# Patient Record
Sex: MALE | Race: WHITE
[De-identification: names, ages, dates, MRNs, and addresses within clinical notes are randomized per-mention and may not be internally consistent; named-entity substitution may affect disease eponyms.]

---

## 2018-09-20 ENCOUNTER — HOSPITAL ENCOUNTER (EMERGENCY)
Dept: HOSPITAL 97 - ER | Age: 53
Discharge: HOME | End: 2018-09-20
Payer: COMMERCIAL

## 2018-09-20 DIAGNOSIS — R06.00: Primary | ICD-10-CM

## 2018-09-20 DIAGNOSIS — K21.9: ICD-10-CM

## 2018-09-20 DIAGNOSIS — E78.00: ICD-10-CM

## 2018-09-20 LAB
ALBUMIN SERPL BCP-MCNC: 3.7 G/DL (ref 3.4–5)
ALP SERPL-CCNC: 105 U/L (ref 45–117)
ALT SERPL W P-5'-P-CCNC: 41 U/L (ref 12–78)
AST SERPL W P-5'-P-CCNC: 25 U/L (ref 15–37)
BUN BLD-MCNC: 21 MG/DL (ref 7–18)
GLUCOSE SERPLBLD-MCNC: 106 MG/DL (ref 74–106)
HCT VFR BLD CALC: 45.4 % (ref 39.6–49)
INR BLD: 0.98
LYMPHOCYTES # SPEC AUTO: 2.1 K/UL (ref 0.7–4.9)
MAGNESIUM SERPL-MCNC: 2.3 MG/DL (ref 1.8–2.4)
MCH RBC QN AUTO: 32.5 PG (ref 27–35)
MCV RBC: 93.3 FL (ref 80–100)
NT-PROBNP SERPL-MCNC: 11 PG/ML (ref ?–125)
PMV BLD: 8 FL (ref 7.6–11.3)
POTASSIUM SERPL-SCNC: 4.3 MMOL/L (ref 3.5–5.1)
RBC # BLD: 4.87 M/UL (ref 4.33–5.43)
TROPONIN (EMERG DEPT USE ONLY): < 0.02 NG/ML (ref 0–0.04)

## 2018-09-20 PROCEDURE — 93005 ELECTROCARDIOGRAM TRACING: CPT

## 2018-09-20 PROCEDURE — 96374 THER/PROPH/DIAG INJ IV PUSH: CPT

## 2018-09-20 PROCEDURE — 83735 ASSAY OF MAGNESIUM: CPT

## 2018-09-20 PROCEDURE — 83880 ASSAY OF NATRIURETIC PEPTIDE: CPT

## 2018-09-20 PROCEDURE — 36415 COLL VENOUS BLD VENIPUNCTURE: CPT

## 2018-09-20 PROCEDURE — 80076 HEPATIC FUNCTION PANEL: CPT

## 2018-09-20 PROCEDURE — 71275 CT ANGIOGRAPHY CHEST: CPT

## 2018-09-20 PROCEDURE — 85610 PROTHROMBIN TIME: CPT

## 2018-09-20 PROCEDURE — 85025 COMPLETE CBC W/AUTO DIFF WBC: CPT

## 2018-09-20 PROCEDURE — 99285 EMERGENCY DEPT VISIT HI MDM: CPT

## 2018-09-20 PROCEDURE — 80048 BASIC METABOLIC PNL TOTAL CA: CPT

## 2018-09-20 PROCEDURE — 96375 TX/PRO/DX INJ NEW DRUG ADDON: CPT

## 2018-09-20 PROCEDURE — 71045 X-RAY EXAM CHEST 1 VIEW: CPT

## 2018-09-20 PROCEDURE — 84484 ASSAY OF TROPONIN QUANT: CPT

## 2018-09-20 NOTE — RAD REPORT
EXAM DESCRIPTION:  RAD - Chest Single View - 9/20/2018 7:40 am

 

CLINICAL HISTORY:  CHEST PAIN

Chest pain.

 

COMPARISON:  No comparisons

 

FINDINGS:  Portable technique limits examination quality.

 

The lungs are grossly clear. The heart is normal in size. No displaced fractures.Hardware is present 
in the cervical spine.

 

IMPRESSION:  No acute intrathoracic process suspected.

## 2018-09-20 NOTE — RAD REPORT
EXAM DESCRIPTION:  CT - Chest For Pe Angio - 9/20/2018 8:25 am

 

CLINICAL HISTORY:  Chest pain.

Chest pain;Dyspnea

 

COMPARISON:  No comparisons

 

TECHNIQUE:  CT angiogram of the pulmonary arteries was performed with MIP.

 

All CT scans are performed using dose optimization technique as appropriate and may include automated
 exposure control or mA/KV adjustment according to patient size.

 

FINDINGS:  No evidence of pulmonary thromboembolism.

 

No acute aortic finding demonstrated.

 

Linear subsegmental atelectasis is present in the right lung base. Mild interstitial pulmonary edema 
versus interstitial pneumonia suspected.

 

No significant pericardial or pleural fluid.

 

Postsurgical hardware plate noted in the lower cervical spine. The paraspinal soft tissues in the reg
ion are mildly thickened without a significant fluid collection seen.

 

IMPRESSION:  No evidence of pulmonary thromboembolism.

 

Prominent interstitial markings bilaterally could indicate mild interstitial pulmonary edema versus i
nterstitial pneumonitis.

## 2018-09-20 NOTE — EDPHYS
Physician Documentation                                                                           

 [facility]                                                                

Name: Moises Wright                                                                                 

Age: 53 yrs                                                                                       

Sex: Male                                                                                         

: 1965                                                                                   

MRN: [id number]                                                                                   

Arrival Date: 2018                                                                          

Time: 06:56                                                                                       

Account#: [id number]                                                                            

Bed 6                                                                                             

Private MD: Ja Bueno                                                                         

ED Physician Jean Woodruff                                                                       

HPI:                                                                                              

                                                                                             

07:07 This 53 yrs old  Male presents to ER via Wheelchair with complaints of Chest   kdr 

      Pain > 31 y/o.                                                                              

07:07 The patient or guardian reports chest pain that is located primarily in the substernal  kdr 

      area, anterior chest wall, bilaterally. Onset: suddenly, this morning. The pain             

      radiates to neck, back. Associated signs and symptoms: Pertinent positives: None.           

      Pertinent negatives: diaphoresis, dizziness, nausea, shortness of breath. The chest         

      pain is described as aching, sharp. Duration: The patient or guardian reports multiple      

      episodes, that are intermittent, that wax and wane, with no pattern. Modifying factors:     

      The symptoms are alleviated by nothing. the symptoms are aggravated by nothing.             

      Severity of pain: At its worst the pain was severe incapacitating in the emergency          

      department the pain is unchanged. The patient has not experienced similar symptoms in       

      the past. The patient has been recently seen by a physician: Had a cervical spine           

      fusion a few weeks ago and has not had any pain like this in the past.                      

                                                                                                  

Historical:                                                                                       

- Allergies:                                                                                      

07:23 No Known Allergies;                                                                     tw2 

- Home Meds:                                                                                      

07:23 Crestor 20 mg Oral tab 1 tab once daily [Active]; lisinopril 5 mg Oral tab 1 tab once   tw2 

      daily [Active]; omeprazole 20 mg Oral cpDR 1 cap once daily [Active]; WelChol 625 mg        

      Oral tab 6 tabs once daily [Active];                                                        

- PMHx:                                                                                           

07:23 GERD; High Cholesterol;                                                                 tw2 

                                                                                                  

- Immunization history:: Adult Immunizations Adult Immunizations up to date.                      

- Social history:: Smoking status: Smoking status: Patient/guardian denies using                  

  tobacco, Patient uses alcohol, on a daily basis. wine.                                          

- Ebola Screening: : Patient denies travel to an Ebola-affected area in the 21 days               

  before illness onset Patient negative for fever greater than or equal to 101.5                  

  degrees Fahrenheit, and additional compatible Ebola Virus Disease symptoms Patient              

  denies exposure to infectious person Patient denies travel to an Ebola-affected area            

  in the 21 days before illness onset.                                                            

                                                                                                  

                                                                                                  

ROS:                                                                                              

07:07 Constitutional: Negative for fever, chills, and weight loss, Eyes: Negative for injury, kdr 

      pain, redness, and discharge, ENT: Negative for injury, pain, and discharge, Neck:          

      Negative for injury, pain, and swelling - prior incision from cervical spine fusion         

      Respiratory: Negative for shortness of breath, cough, wheezing, and pleuritic chest         

      pain, Abdomen/GI: Negative for abdominal pain, nausea, vomiting, diarrhea, and              

      constipation, Back: Negative for injury and pain, : Negative for injury, bleeding,        

      discharge, and swelling, MS/Extremity: Negative for injury and deformity, Skin:             

      Negative for injury, rash, and discoloration, Neuro: Negative for headache, weakness,       

      numbness, tingling, and seizure activity. Psych: Negative for depression, anxiety,          

      suicide ideation, homicidal ideation, and hallucinations, Allergy/Immunology: Negative      

      for hives, rash, and allergies, Endocrine: Negative for neck swelling, polydipsia,          

      polyuria, polyphagia, and marked weight changes, Hematologic/Lymphatic: Negative for        

      swollen nodes, abnormal bleeding, and unusual bruising.                                     

07:07 Cardiovascular: Positive for chest pain, Negative for edema, orthopnea, palpitations,       

      paroxysmal nocturnal dyspnea.                                                               

                                                                                                  

Exam:                                                                                             

07:07 Constitutional:  This is a well developed, well nourished patient who is awake, alert,  kdr 

      and in no acute distress. Head/Face:  Normocephalic, atraumatic. Eyes:  Pupils equal        

      round and reactive to light, extra-ocular motions intact.  Lids and lashes normal.          

      Conjunctiva and sclera are non-icteric and not injected.  Cornea within normal limits.      

      Periorbital areas with no swelling, redness, or edema. Neck:  Trachea midline, no           

      thyromegaly or masses palpated, and no cervical lymphadenopathy.  Supple, full range of     

      motion without nuchal rigidity, or vertebral point tenderness.  No Meningismus.             

      Chest/axilla:  Normal chest wall appearance and motion.  Nontender with no deformity.       

      No lesions are appreciated. Cardiovascular:  Regular rate and rhythm with a normal S1       

      and S2.  No gallops, murmurs, or rubs.  Normal PMI, no JVD.  No pulse deficits.             

      Respiratory:  Lungs have equal breath sounds bilaterally, clear to auscultation and         

      percussion.  No rales, rhonchi or wheezes noted.  No increased work of breathing, no        

      retractions or nasal flaring. Abdomen/GI:  Soft, non-tender, with normal bowel sounds.      

      No distension or tympany.  No guarding or rebound.  No evidence of tenderness               

      throughout. Back:  No spinal tenderness.  No costovertebral tenderness.  Full range of      

      motion. Skin:  Warm, dry with normal turgor.  Normal color with no rashes, no lesions,      

      and no evidence of cellulitis. MS/ Extremity:  Pulses equal, no cyanosis.                   

      Neurovascular intact.  Full, normal range of motion. Neuro:  Awake and alert, GCS 15,       

      oriented to person, place, time, and situation.  Cranial nerves II-XII grossly intact.      

      Motor strength 5/5 in all extremities.  Sensory grossly intact.  Cerebellar exam            

      normal.  Normal gait. Psych:  Awake, alert, with orientation to person, place and time.     

       Behavior, mood, and affect are within normal limits.                                       

                                                                                                  

Vital Signs:                                                                                      

07:15  / 99; Pulse 90; Resp 18; Temp 97.9(O); Pulse Ox 94% on R/A; Weight 104.33 kg;    hj  

      Height 6 ft. 1 in. (185.42 cm); Pain 6/10;                                                  

07:29 Pulse Ox 99% on 2 lpm NC;                                                                 

08:12  / 83; Pulse 80; Resp 18; Pulse Ox 97% on 2 lpm NC;                                 

09:30  / 58; Pulse 78; Resp 18; Pulse Ox 100% on 2 lpm NC;                                

10:05  / 87; Pulse 87; Resp 18; Pulse Ox 100% on 2 lpm NC;                                

11:01  / 84; Pulse 90; Resp 18; Pulse Ox 97% on R/A;                                      

07:15 Body Mass Index 30.34 (104.33 kg, 185.42 cm)                                              

                                                                                                  

MDM:                                                                                              

07:07 HEART Score: History: Moderately Suspicious (1), ECG: Normal (0), Age: > 45 and < 65    kdr 

      years (1), Risk Factors: No Risk Factors Known (0), Troponin: < or = 1 x Normal Limit       

      (0), Total Score =. HEART Score: Total Score =. THOMAS Risk Score: TOTAL SCORE = 0. Data      

      reviewed: vital signs, nurses notes, lab test result(s), EKG, radiologic studies. ED        

      course: Heart Score = 2.                                                                    

10:40 ED course: Offered the patient admission locally or transfer back to [facility]. He      kdr 

      indicated that he would rather be discharged and follow-up. His wife agreed that he         

      seemed much better than when he was admitted. She felt he was back to his baseline and      

      was comfortable with discharge.                                                             

10:44 Patient medically screened.                                                             kdr 

                                                                                                  

                                                                                             

07:07 Order name: Basic Metabolic Panel; Complete Time: 07:59                                 kdr 

                                                                                             

07:07 Order name: CBC with Diff; Complete Time: 07:59                                         kdr 

                                                                                             

07:07 Order name: LFT's; Complete Time: 07:59                                                 kdr 

                                                                                             

07:07 Order name: Magnesium; Complete Time: 07:59                                             kdr 

                                                                                             

07:07 Order name: NT PRO-BNP; Complete Time: 07:59                                            kdr 

                                                                                             

07:07 Order name: PT-INR; Complete Time: 07:59                                                kdr 

                                                                                             

07:07 Order name: Troponin (emerg Dept Use Only); Complete Time: 07:59                        kdr 

                                                                                             

07:07 Order name: XRAY Chest (1 view); Complete Time: 09:16                                   kdr 

                                                                                             

08:10 Order name: CT Chest For PE Angio; Complete Time: 09:16                                 kdr 

                                                                                             

09:19 Order name: Troponin (emerg Dept Use Only); Complete Time: 10:35                        kdr 

                                                                                             

07:07 Order name: EKG; Complete Time: 07:08                                                   kdr 

                                                                                             

07:07 Order name: Cardiac monitoring; Complete Time: 07:10                                    kdr 

                                                                                             

07:07 Order name: EKG - Nurse/Tech; Complete Time: 07:10                                      kdr 

                                                                                             

07:07 Order name: IV Saline Lock; Complete Time: 07:20                                        kdr 

                                                                                             

07:07 Order name: Labs collected and sent; Complete Time: 07:20                               kdr 

                                                                                             

07:07 Order name: O2 Per Protocol; Complete Time: 07:10                                       kdr 

                                                                                             

07:07 Order name: O2 Sat Monitoring; Complete Time: 07:10                                     kdr 

                                                                                                  

Administered Medications:                                                                         

07:07 Drug: morphine 4 mg Route: IVP; Site: right antecubital;                                iw  

08:16 Follow up: Response: No adverse reaction                                                hj  

07:07 Drug: Zofran 4 mg Route: IVP; Site: right antecubital;                                  iw  

08:16 Follow up: Response: No adverse reaction                                                hj  

07:07 Drug: Aspirin Chewable Tablet 324 mg Route: PO;                                         iw  

08:16 Follow up: Response: No adverse reaction                                                hj  

09:58 Drug: morphine 4 mg Route: IVP; Site: right antecubital;                                hj  

11:02 Follow up: Response: No adverse reaction; Pain is decreased                             hj  

09:58 Drug: Zofran 4 mg Route: IVP; Site: right antecubital;                                  hj  

11:02 Follow up: Response: No adverse reaction                                                hj  

                                                                                                  

                                                                                                  

Disposition:                                                                                      

18 10:44 Discharged to Home. Impression: Dyspnea, unspecified, Chest pain, unspecified.     

- Condition is Stable.                                                                            

- Discharge Instructions: Shortness of Breath, Nonspecific Chest Pain, Easy-to-Read,              

  Pneumonitis.                                                                                    

- Prescriptions for Tramadol 50 mg Oral Tablet - take 1 tablet by ORAL route every 8              

  hours as needed; 12 tablet. Methylprednisolone 8 mg Oral Tablet - take 2 tablet by              

  ORAL route once daily for 5 days then 2 tablets (16 mg) every other day for 1 month;            

  10 tablet.                                                                                      

- Medication Reconciliation Form, Thank You Letter form.                                          

- Follow up: Ja Bueno MD; When: 2 - 3 days; Reason: If symptoms return, Further             

  diagnostic work-up, Recheck today's complaints, Continuance of care, Re-evaluation by           

  your physician.                                                                                 

- Problem is new.                                                                                 

- Symptoms have improved.                                                                         

                                                                                                  

                                                                                                  

                                                                                                  

Signatures:                                                                                       

Dispatcher [employer]                           EDMS                                                 

Jean Woodruff MD MD   kdr                                                  

Laura No RN RN   iw                                                   

Paul Schroeder RN RN hj Wise, Tara RN                          RN   tw2                                                  

                                                                                                  

Corrections: (The following items were deleted from the chart)                                    

11:33 10:44 2018 10:44 Discharged to Home. Impression: Dyspnea, unspecified; Chest      iw  

      pain, unspecified. Condition is Stable. Forms are Medication Reconciliation Form, Thank     

      You Letter, Antibiotic Education, Prescription Opioid Use. Follow up: Ja Bueno;         

      When: 2 - 3 days; Reason: If symptoms return, Further diagnostic work-up, Recheck           

      today's complaints, Continuance of care, Re-evaluation by your physician. Problem is        

      new. Symptoms have improved. kdr                                                            

                                                                                                  

**************************************************************************************************

## 2018-09-20 NOTE — ER
Nurse's Notes                                                                                     

 [facility]                                                                

Name: Moises Wright                                                                                 

Age: 53 yrs                                                                                       

Sex: Male                                                                                         

: 1965                                                                                   

MRN: [id number]                                                                                   

Arrival Date: 2018                                                                          

Time: 06:56                                                                                       

Account#: [id number]                                                                            

Bed 6                                                                                             

Private MD: Ja Bueno                                                                         

Diagnosis: Dyspnea, unspecified;Chest pain, unspecified                                           

                                                                                                  

Presentation:                                                                                     

                                                                                             

07:04 Presenting complaint: Patient states: had vertebrae fused 2 weeks ago, last night was   iw  

      having back pain then then pain moved into his chest, c/o midsternal CP non radiating.      

      Transition of care: patient was not received from another setting of care. Onset of         

      symptoms was 2018. Risk Assessment: Do you want to hurt yourself or           

      someone else? Patient reports no desire to harm self or others. Initial Sepsis Screen:      

      Does the patient meet any 2 criteria? No. Patient's initial sepsis screen is negative.      

      Does the patient have a suspected source of infection? No. Patient's initial sepsis         

      screen is negative. Care prior to arrival: None.                                            

07:04 Method Of Arrival: Wheelchair                                                           iw  

07:04 Acuity: GEOVANNI 3                                                                           iw  

                                                                                                  

Historical:                                                                                       

- Allergies:                                                                                      

07:23 No Known Allergies;                                                                     tw2 

- Home Meds:                                                                                      

07:23 Crestor 20 mg Oral tab 1 tab once daily [Active]; lisinopril 5 mg Oral tab 1 tab once   tw2 

      daily [Active]; omeprazole 20 mg Oral cpDR 1 cap once daily [Active]; WelChol 625 mg        

      Oral tab 6 tabs once daily [Active];                                                        

- PMHx:                                                                                           

07:23 GERD; High Cholesterol;                                                                 tw2 

                                                                                                  

- Immunization history:: Adult Immunizations Adult Immunizations up to date.                      

- Social history:: Smoking status: Smoking status: Patient/guardian denies using                  

  tobacco, Patient uses alcohol, on a daily basis. wine.                                          

- Ebola Screening: : Patient denies travel to an Ebola-affected area in the 21 days               

  before illness onset Patient negative for fever greater than or equal to 101.5                  

  degrees Fahrenheit, and additional compatible Ebola Virus Disease symptoms Patient              

  denies exposure to infectious person Patient denies travel to an Ebola-affected area            

  in the 21 days before illness onset.                                                            

                                                                                                  

                                                                                                  

Screenin:21 Abuse screen: Denies threats or abuse. Nutritional screening: No deficits noted.        tw2 

      Tuberculosis screening: No symptoms or risk factors identified. Fall Risk None              

      identified.                                                                                 

                                                                                                  

Assessment:                                                                                       

07:15 General: provider in room with family; .                                                hj  

07:21 General: Appears in no apparent distress. uncomfortable, Behavior is calm, cooperative, hj  

      appropriate for age. Pain: Pain does not radiate. Pain currently is 6 out of 10 on a        

      pain scale. Quality of pain is described as pressure, Pain began 1 day ago. Is              

      intermittent. Neuro: Level of Consciousness is awake, alert, obeys commands, Oriented       

      to person, place, time, situation, Appropriate for age. Cardiovascular: Capillary           

      refill < 3 seconds Patient's skin is warm and dry. Respiratory: Airway is patent            

      Respiratory effort is even, unlabored, Respiratory pattern is regular, symmetrical. GI:     

      No signs and/or symptoms were reported involving the gastrointestinal system. : No        

      signs and/or symptoms were reported regarding the genitourinary system. EENT: No signs      

      and/or symptoms were reported regarding the EENT system. Derm: No signs and/or symptoms     

      reported regarding the dermatologic system. Musculoskeletal: No signs and/or symptoms       

      reported regarding the musculoskeletal system.                                              

07:49 Reassessment: Patient and/or family updated on plan of care and expected duration. Pain hj  

      level reassessed. Patient is alert, oriented x 3, equal unlabored respirations, skin        

      warm/dry/pink. states, "the pressure feeling is gone its just the little tightness now,     

      i feel better now"; Patient states symptoms have improved.                                  

09:30 Reassessment: Patient and/or family updated on plan of care and expected duration. Pain hj  

      level reassessed. Patient is alert, oriented x 3, equal unlabored respirations, skin        

      warm/dry/pink. MD at bedside for results and POC;.                                          

10:04 Reassessment: Patient and/or family updated on plan of care and expected duration. Pain hj  

      level reassessed. Patient is alert, oriented x 3, equal unlabored respirations, skin        

      warm/dry/pink. awaiting result of 2nd set of trop;.                                         

10:41 Reassessment: provider in room for POC:.                                                hj  

11:24 Reassessment: Patient appears in no apparent distress at this time. Patient and/or      tw2 

      family updated on plan of care and expected duration. Pain level reassessed. Patient is     

      alert, oriented x 3, equal unlabored respirations, skin warm/dry/pink.                      

                                                                                                  

Vital Signs:                                                                                      

07:15  / 99; Pulse 90; Resp 18; Temp 97.9(O); Pulse Ox 94% on R/A; Weight 104.33 kg;    hj  

      Height 6 ft. 1 in. (185.42 cm); Pain 6/10;                                                  

07:29 Pulse Ox 99% on 2 lpm NC;                                                               hj  

08:12  / 83; Pulse 80; Resp 18; Pulse Ox 97% on 2 lpm NC;                               hj  

09:30  / 58; Pulse 78; Resp 18; Pulse Ox 100% on 2 lpm NC;                              hj  

10:05  / 87; Pulse 87; Resp 18; Pulse Ox 100% on 2 lpm NC;                              hj  

11:01  / 84; Pulse 90; Resp 18; Pulse Ox 97% on R/A;                                    hj  

07:15 Body Mass Index 30.34 (104.33 kg, 185.42 cm)                                            hj  

                                                                                                  

ED Course:                                                                                        

06:56 Patient arrived in ED.                                                                  am2 

06:57 Ja Bueno MD is Private Physician.                                                 am2 

06:58 Paul Schroeder, RN is Primary Nurse.                                                    hj  

07:05 Triage completed.                                                                       iw  

07:06 Jean Woodruff MD is Attending Physician.                                              kdr 

07:08 EKG done, by ED staff, reviewed by Jean Woodruff MD.                                    hj  

07:12 Inserted saline lock: 20 gauge in right antecubital area, using aseptic technique.      tw2 

      Blood collected.                                                                            

07:21 Patient maintains SpO2 saturation greater than 95% on room air.                         tw2 

07:21 Patient has correct armband on for positive identification. Placed in gown. Bed in low  hj  

      position. Call light in reach. Side rails up X 1. Adult w/ patient.                         

07:21 Cardiac monitor on. Pulse ox on. NIBP on.                                               hj  

07:22 Arm band placed on right wrist.                                                         hj  

07:38 X-ray completed. Portable x-ray completed in exam room. Patient tolerated procedure     sw  

      well.                                                                                       

07:38 XRAY Chest (1 view) In Process Unspecified.                                             EDMS

08:20 CT completed. Patient tolerated procedure well. Patient moved to CT via stretcher.      jg6 

      Patient moved back from CT.                                                                 

08:25 CT Chest For PE Angio In Process Unspecified.                                           EDMS

09:57 Troponin (emerg Dept Use Only) Sent.                                                    hj  

10:43 Ja Bueno MD is Referral Physician.                                                kdr 

11:15 called and left message with the office of Dr. Tad Denise \T\[phone] for our ED   eb  

      doc. for patient consultation.                                                              

11:25 No provider procedures requiring assistance completed. IV discontinued, intact,         tw2 

      bleeding controlled, No redness/swelling at site. Pressure dressing applied.                

11:26 [address] Denise with ED doctor for patient consulation.                            eb  

                                                                                                  

Administered Medications:                                                                         

07:07 Drug: morphine 4 mg Route: IVP; Site: right antecubital;                                iw  

08:16 Follow up: Response: No adverse reaction                                                hj  

07:07 Drug: Zofran 4 mg Route: IVP; Site: right antecubital;                                  iw  

08:16 Follow up: Response: No adverse reaction                                                hj  

07:07 Drug: Aspirin Chewable Tablet 324 mg Route: PO;                                         iw  

08:16 Follow up: Response: No adverse reaction                                                hj  

09:58 Drug: morphine 4 mg Route: IVP; Site: right antecubital;                                hj  

11:02 Follow up: Response: No adverse reaction; Pain is decreased                             hj  

09:58 Drug: Zofran 4 mg Route: IVP; Site: right antecubital;                                  hj  

11:02 Follow up: Response: No adverse reaction                                                hj  

                                                                                                  

                                                                                                  

Outcome:                                                                                          

10:44 Discharge ordered by MD.                                                                kdr 

11:25 Discharged to home ambulatory, with significant other.                                  tw2 

11:25 Condition: stable                                                                           

11:25 Discharge instructions given to patient, significant other, Instructed on discharge         

      instructions, follow up and referral plans. no drinking with medication, no driving         

      heavy equipment, medication usage, Demonstrated understanding of instructions,              

      follow-up care, medications, Prescriptions given X 2.                                       

11:33 Patient left the ED.                                                                    iw  

                                                                                                  

Signatures:                                                                                       

Dispatcher [employer]                           EDMS                                                 

Jean Woodruff MD MD kdr Williams, Irene RN                     Merle Chua Henry, RN RN hj Wise, Tara RN                          RN   tw2                                                  

Snow Burgess Elizabeth eb Garcia, Jessica jg6                                                  

                                                                                                  

Corrections: (The following items were deleted from the chart)                                    

11:04 07:04 Presenting complaint: Patient states: had vertebrae infused 2 weeks ago, last     iw  

      night was having back pain then then pain moved into his chest, c/o midsternal CP non       

      radiating iw                                                                                

                                                                                                  

**************************************************************************************************

## 2018-09-21 NOTE — EKG
Test Date:    2018-09-20               Test Time:    07:08:55

Technician:   SHANIKA                                     

                                                     

MEASUREMENT RESULTS:                                       

Intervals:                                           

Rate:         94                                     

MI:           150                                    

QRSD:         76                                     

QT:           326                                    

QTc:          407                                    

Axis:                                                

P:            30                                     

MI:           150                                    

QRS:          44                                     

T:            36                                     

                                                     

INTERPRETIVE STATEMENTS:                                       

                                                     

Normal sinus rhythm

Normal ECG

No previous ECG available for comparison



Electronically Signed On 09-21-18 06:55:01 CDT by Raffi Salas

## 2024-09-11 LAB
ANION GAP SERPL CALC-SCNC: 10.1 MEQ/L (ref 5–15)
APTT BLD: 37 SECONDS (ref 24.3–36.9)
BUN BLD-MCNC: 24 MG/DL (ref 7–18)
GLUCOSE SERPLBLD-MCNC: 103 MG/DL (ref 74–106)
HCT VFR BLD CALC: 43.2 % (ref 39.6–49)
HGB BLD-MCNC: 15 G/DL (ref 13.6–17.9)
INR BLD: 0.98
LYMPHOCYTES # SPEC AUTO: 1.9 K/UL (ref 0.7–4.9)
MCH RBC QN AUTO: 31.9 PG (ref 27–35)
MCHC RBC AUTO-ENTMCNC: 34.8 G/DL (ref 32–36)
MCV RBC: 91.6 FL (ref 80–100)
NRBC # BLD: 0 10*3/UL (ref 0–0)
NRBC BLD AUTO-RTO: 0.1 % (ref 0–0)
PMV BLD: 8.1 FL (ref 7.6–11.3)
POTASSIUM SERPL-SCNC: 4.1 MEQ/L (ref 3.5–5.1)
PROTHROMBIN TIME: 11 SECONDS (ref 9.4–12.5)
RBC # BLD: 4.72 M/UL (ref 4.33–5.43)
WBC # BLD AUTO: 6 THOU/UL (ref 4.3–10.9)

## 2024-09-11 NOTE — RAD REPORT
EXAM DESCRIPTION:  RAD - Chest Pa And Lat (2 Views) - 9/11/2024 10:25 am

 

CLINICAL HISTORY:  Pre op pending rotator cuff repair

Chest pain.

 

COMPARISON:  Chest Single View dated 9/20/2018

 

FINDINGS:  The lungs are clear. The heart is normal in size. No displaced fractures. Cervical spine h
ardware plate.

 

IMPRESSION:  No acute or concerning finding suspected.

## 2024-09-12 NOTE — EKG
Test Date:    2024-09-11               Test Time:    09:55:21

Technician:   EDMOND                                     

                                                     

MEASUREMENT RESULTS:                                       

Intervals:                                           

Rate:         62                                     

GA:           164                                    

QRSD:         84                                     

QT:           384                                    

QTc:          389                                    

Axis:                                                

P:            25                                     

GA:           164                                    

QRS:          58                                     

T:            37                                     

                                                     

INTERPRETIVE STATEMENTS:                                       

                                                     

Normal sinus rhythm

Normal ECG

Compared to ECG 09/20/2018 07:08:55

No significant changes



Electronically Signed On 09-12-24 16:21:53 CDT by Werner Aburto

## 2024-09-13 ENCOUNTER — HOSPITAL ENCOUNTER (OUTPATIENT)
Dept: HOSPITAL 97 - OR | Age: 59
Discharge: HOME | End: 2024-09-13
Attending: ORTHOPAEDIC SURGERY
Payer: COMMERCIAL

## 2024-09-13 VITALS — OXYGEN SATURATION: 100 % | DIASTOLIC BLOOD PRESSURE: 81 MMHG | SYSTOLIC BLOOD PRESSURE: 135 MMHG

## 2024-09-13 VITALS — TEMPERATURE: 97 F

## 2024-09-13 DIAGNOSIS — M75.21: ICD-10-CM

## 2024-09-13 DIAGNOSIS — M75.121: Primary | ICD-10-CM

## 2024-09-13 DIAGNOSIS — M75.41: ICD-10-CM

## 2024-09-13 DIAGNOSIS — M75.51: ICD-10-CM

## 2024-09-13 PROCEDURE — 85610 PROTHROMBIN TIME: CPT

## 2024-09-13 PROCEDURE — 73020 X-RAY EXAM OF SHOULDER: CPT

## 2024-09-13 PROCEDURE — 85730 THROMBOPLASTIN TIME PARTIAL: CPT

## 2024-09-13 PROCEDURE — 80048 BASIC METABOLIC PNL TOTAL CA: CPT

## 2024-09-13 PROCEDURE — 29822 SHO ARTHRS SRG LMTD DBRDMT: CPT

## 2024-09-13 PROCEDURE — 36415 COLL VENOUS BLD VENIPUNCTURE: CPT

## 2024-09-13 PROCEDURE — 0MB14ZZ EXCISION OF RIGHT SHOULDER BURSA AND LIGAMENT, PERCUTANEOUS ENDOSCOPIC APPROACH: ICD-10-PCS

## 2024-09-13 PROCEDURE — 85025 COMPLETE CBC W/AUTO DIFF WBC: CPT

## 2024-09-13 PROCEDURE — 0LB14ZZ EXCISION OF RIGHT SHOULDER TENDON, PERCUTANEOUS ENDOSCOPIC APPROACH: ICD-10-PCS

## 2024-09-13 PROCEDURE — 29827 SHO ARTHRS SRG RT8TR CUF RPR: CPT

## 2024-09-13 PROCEDURE — 93005 ELECTROCARDIOGRAM TRACING: CPT

## 2024-09-13 PROCEDURE — 71046 X-RAY EXAM CHEST 2 VIEWS: CPT

## 2024-09-13 NOTE — P.OP
Preoperative diagnosis: Right shoulder rotator cuff tear, biceps tendinitis, 

impingement syndrome


Postoperative diagnosis: Right shoulder rotator cuff tear, subacromial bursitis,

impingement syndrom


Primary procedure: Right shoulder arthroscopic rotator cuff repair


Secondary procedure: Right arthroscopic subscapularis debridement and 

subacromial bursectomy


Anesthesia: General


Estimated blood loss: 10 cc


Specimen: None


Findings: See dictation


Operative Technique: 


Indication For Procedure:  Moises is a 59-year-old male who presented to my clinic 

with signs, symptoms, and MRI findings consistent with a right shoulder full-

thickness rotator cuff tear.  I discussed with the patient risks and benefits 

associated with operative and nonoperative treatment.  He expressed 

understanding and elected to proceed with operative treatment.





Description Of Procedure:  After informed consent was obtained, the patient was 

identified in the preoperative holding area.  The right upper extremity was 

marked.  The patient then was brought to the PACU where he underwent a right-

sided interscalene block performed by Anesthesia.  The patient was brought back 

to the operating room, transferred to the operative table in supine fashion, 

placed under general endotracheal anesthesia.  He was then placed in a beach 

chair position with his extremities well padded.  The right upper extremity was 

then prepped and draped in usual sterile fashion.  A time-out was initiated.  

The correct patient and procedure were performed and identified.  The patient 

did receive preoperative prophylactic antibiotics.  Via the posterior portal 

position, a spinal needle was introduced in the glenohumeral joint and the 

shoulder was injected with 30 cc of normal saline to distend the capsule.  A 

stab incision was made posteriorly and a posterior portal was created.  

Arthroscope was brought in via the posterior portal position and diagnostic 

arthroscopy was performed.  Under direct visualization, an anterior portal and 

cannula were created.  The patient was noted to have some fraying of the 

superior border of the subscapularis, which was debrided using the arthroscopic 

shaver.  Subscapularis was found to be stable to probe.  There were no 

significant instability of the superior labrum or anterior posterior labrum, 

which were stable to probe.  There was mild tenosynovitis of the bicipital 

tendon near the anchor.    There were no loose bodies within the axillary pouch.

 The patient was noted to have a small full-thickness tear of the anterior 

aspect of the supraspinatus.  A lateral portal was created and an arthroscopic 

shaver was then used to debride the greater tuberosity.  The rotator cuff tear 

was noted to reduce to the greater tuberosity.  Greater tuberosity was debrided 

using the arthroscopic shaver to create a bleeding bony bed.  The undersurface 

of the rotator cuff tear was also debrided using the arthroscopic shaver to 

remove any unhealthy tissue.  The arthroscope was then brought in the 

subacromial space.  There is significant subacromial bursitis with significant 

hyperemia of the bursal tissue.  A subacromial bursectomy was performed using 

arthroscopic shaver.  The rotator cuff tear was found.  It was very small in 

size.  A speed fix configuration was performed.  A fiber tape suture was then 

passed in an inverted horizontal mattress type fashion and brought to a lateral 

swivel lock anchor for reduction of the rotator cuff tear on the greater 

tuberosity.  Remaining suture limbs were cut.  There was some significant 

fraying of a coracoacromial ligament as well as some undersurface spurring of 

the acromion and acromioplasty was performed using a radiofrequency ablator and 

an arthroscopic bur.  Arthroscopic instruments were then removed without com

plication.  Wounds were then irrigated thoroughly with normal saline.  

Subcutaneous tissue was approximated using a 2-0 Vicryl.  Portals were 

approximated using a 3-0 Monocryl.  Sterile dressings were applied.  Shoulder 

immobilizer was placed.  The patient was awakened and transferred to PACU in 

stable condition.





Postoperative Plan:  The patient will be nonweightbearing in a shoulder 

immobilizer for 6 weeks.  We will follow the medium rotator cuff repair protocol

3 weeks postoperatively.


Complications: None


Implants: Arthrex 4.75 mm swivel lock


Fluids & blood products: Per anesthesia record


Transferred to: Recovery Room


Condition: Good

## 2024-09-13 NOTE — P.BOP
Preoperative diagnosis: right shoulder rotator cuff tear, biceps tendinitis, 

impingement syndrome


Postoperative diagnosis: right shoulder rotator cuff tear, subacromial bursitis,

impingement syndrom


Primary procedure: Right shoulder arthroscopic rotator cuff repair


Secondary procedure: Right arthroscopic subscapularis debridement and 

subacromial bursectomy


Estimated blood loss: 10 cc


Specimen: None


Findings: See dictation


Anesthesia: General


Complications: None


Implants: 4.75 mm Arthrex swivelock


Fluids & blood products: Per anesthesia record


Transferred to: Recovery Room


Condition: Good

## 2024-09-13 NOTE — RAD REPORT
EXAM DESCRIPTION:  RAD - Shoulder 1 View - 9/13/2024 10:35 am

 

CLINICAL HISTORY:   shoulder surgery

 

FINDINGS:  Frontal view of the shoulder was obtained.

 

No fracture or dislocation is seen. Postsurgical changes noted